# Patient Record
Sex: FEMALE | Race: WHITE | NOT HISPANIC OR LATINO | Employment: OTHER | URBAN - METROPOLITAN AREA
[De-identification: names, ages, dates, MRNs, and addresses within clinical notes are randomized per-mention and may not be internally consistent; named-entity substitution may affect disease eponyms.]

---

## 2024-01-26 ENCOUNTER — OFFICE VISIT (OUTPATIENT)
Age: 89
End: 2024-01-26
Payer: COMMERCIAL

## 2024-01-26 ENCOUNTER — APPOINTMENT (OUTPATIENT)
Dept: RADIOLOGY | Facility: CLINIC | Age: 89
End: 2024-01-26
Payer: COMMERCIAL

## 2024-01-26 VITALS — DIASTOLIC BLOOD PRESSURE: 69 MMHG | SYSTOLIC BLOOD PRESSURE: 103 MMHG | HEART RATE: 72 BPM

## 2024-01-26 DIAGNOSIS — S82.891A CLOSED FRACTURE OF RIGHT ANKLE, INITIAL ENCOUNTER: ICD-10-CM

## 2024-01-26 DIAGNOSIS — S82.891A CLOSED FRACTURE OF RIGHT ANKLE, INITIAL ENCOUNTER: Primary | ICD-10-CM

## 2024-01-26 PROCEDURE — 73600 X-RAY EXAM OF ANKLE: CPT

## 2024-01-26 PROCEDURE — 99203 OFFICE O/P NEW LOW 30 MIN: CPT | Performed by: ORTHOPAEDIC SURGERY

## 2024-01-26 RX ORDER — QUETIAPINE FUMARATE 25 MG/1
TABLET, FILM COATED ORAL
COMMUNITY
Start: 2023-12-11

## 2024-01-26 RX ORDER — NIFEDIPINE 60 MG/1
TABLET, EXTENDED RELEASE ORAL
COMMUNITY
Start: 2023-11-01

## 2024-01-26 RX ORDER — MEMANTINE HYDROCHLORIDE 10 MG/1
TABLET ORAL
COMMUNITY
Start: 2023-12-29

## 2024-01-26 RX ORDER — PRAVASTATIN SODIUM 10 MG
TABLET ORAL
COMMUNITY
Start: 2023-11-08

## 2024-01-26 RX ORDER — ENOXAPARIN SODIUM 100 MG/ML
INJECTION SUBCUTANEOUS
COMMUNITY
Start: 2023-11-26

## 2024-01-26 RX ORDER — DONEPEZIL HYDROCHLORIDE 10 MG/1
TABLET, FILM COATED ORAL
COMMUNITY
Start: 2024-01-03

## 2024-01-26 RX ORDER — LEVOTHYROXINE SODIUM 88 UG/1
TABLET ORAL
COMMUNITY
Start: 2024-01-13

## 2024-01-26 RX ORDER — ACETAMINOPHEN 325 MG/1
650 TABLET ORAL EVERY 6 HOURS PRN
COMMUNITY

## 2024-01-26 RX ORDER — PRAVASTATIN SODIUM 40 MG
TABLET ORAL
COMMUNITY
Start: 2024-01-03

## 2024-01-26 RX ORDER — SENNA AND DOCUSATE SODIUM 50; 8.6 MG/1; MG/1
1 TABLET, FILM COATED ORAL DAILY
COMMUNITY

## 2024-01-26 RX ORDER — APIXABAN 2.5 MG/1
TABLET, FILM COATED ORAL
COMMUNITY
Start: 2024-01-15

## 2024-01-26 RX ORDER — LISINOPRIL 20 MG/1
TABLET ORAL
COMMUNITY
Start: 2024-01-04

## 2024-01-26 RX ORDER — BISACODYL 5 MG/1
10 TABLET, DELAYED RELEASE ORAL DAILY PRN
COMMUNITY

## 2024-01-26 NOTE — PROGRESS NOTES
Assessment/Plan:  1. Closed fracture of right ankle, initial encounter  CANCELED: XR ankle 3+ vw right        89-year-old female presents for evaluation of right ankle.  The patient has a displaced bimalleolar ankle fracture, which is likely about 3 months old at this point. Her ankle is still located and her pain is relatively well controlled. She may progress her weightbearing in her boot at this point. PT may work on ROM and strength. She will FU in 4 weeks with repeat xrays at that time.     Subjective:   Radhika Angela is a 89 y.o. female who presents today for evaluation of her right ankle. She is unsure when she injured this, though we do have documented admission to her care center on 11/7 and she was in a boot at time of admission. She has been partial weightbearing in her boot- which were orders given by someone she was seeing from [facility]. We also have xrays in our system from 12/18/23 which showed a bimalleolar ankle fracture, with displacement of both fractures and subluxation of the ankle joint, but no dislocation of the ankle. I am able to view these images today. The patient can not provide much history due to her dementia, and her care center worker also does not know much of the patient's history. She patient complains of some pain about the ankle, but good sensation of the lower extremity.       Review of Systems   Constitutional: Negative.  Negative for chills and fever.   HENT: Negative.  Negative for ear pain and sore throat.    Eyes: Negative.  Negative for pain and redness.   Respiratory: Negative.  Negative for shortness of breath and wheezing.    Cardiovascular:  Negative for chest pain and palpitations.   Gastrointestinal: Negative.  Negative for abdominal pain and blood in stool.   Endocrine: Negative.  Negative for polydipsia and polyuria.   Genitourinary: Negative.  Negative for difficulty urinating and dysuria.   Musculoskeletal:         As noted in HPI   Skin: Negative.  Negative  for pallor and rash.   Neurological: Negative.  Negative for dizziness and numbness.   Hematological: Negative.  Negative for adenopathy. Does not bruise/bleed easily.   Psychiatric/Behavioral: Negative.  Negative for confusion and suicidal ideas.          Past Medical History:   Diagnosis Date   • Acquired absence of right breast and nipple    • Adjustment disorder with anxiety    • Alzheimer's dementia (HCC)    • Atrophy of vulva    • Constipation    • Dementia (HCC)    • Essential (primary) hypertension    • Hyperlipidemia    • Hypothyroidism    • Major depressive disorder, single episode    • Muscle weakness (generalized)    • Overactive bladder    • Personal history of malignant neoplasm of breast    • Thrombocytopenia (HCC)    • Unspecified mood (affective) disorder (HCC)        History reviewed. No pertinent surgical history.    History reviewed. No pertinent family history.    Social History     Occupational History   • Not on file   Tobacco Use   • Smoking status: Not on file   • Smokeless tobacco: Not on file   Substance and Sexual Activity   • Alcohol use: Not on file   • Drug use: Not on file   • Sexual activity: Not on file         Current Outpatient Medications:   •  acetaminophen (TYLENOL) 325 mg tablet, Take 650 mg by mouth every 6 (six) hours as needed for mild pain or fever, Disp: , Rfl:   •  bisacodyl (DULCOLAX) 5 mg EC tablet, Take 10 mg by mouth daily as needed for constipation, Disp: , Rfl:   •  donepezil (ARICEPT) 10 mg tablet, , Disp: , Rfl:   •  Eliquis 2.5 MG, , Disp: , Rfl:   •  levothyroxine 88 mcg tablet, , Disp: , Rfl:   •  lisinopril (ZESTRIL) 20 mg tablet, , Disp: , Rfl:   •  magnesium hydroxide (MILK OF MAGNESIA) 800 MG/5ML suspension, Take 30 mL by mouth daily as needed for constipation, Disp: , Rfl:   •  memantine (NAMENDA) 10 mg tablet, , Disp: , Rfl:   •  NIFEdipine ER (ADALAT CC) 60 MG 24 hr tablet, , Disp: , Rfl:   •  pravastatin (PRAVACHOL) 40 mg tablet, , Disp: , Rfl:   •   senna-docusate sodium (SENOKOT-S) 8.6-50 mg per tablet, Take 1 tablet by mouth daily, Disp: , Rfl:   •  sertraline (ZOLOFT) 50 mg tablet, , Disp: , Rfl:   •  enoxaparin (LOVENOX) 30 mg/0.3 mL, , Disp: , Rfl:   •  NIFEdipine (PROCARDIA XL) 60 mg 24 hr tablet, , Disp: , Rfl:   •  pravastatin (PRAVACHOL) 10 mg tablet, , Disp: , Rfl:   •  QUEtiapine (SEROquel) 25 mg tablet, , Disp: , Rfl:     Not on File    Objective:  Vitals:    01/26/24 1052   BP: 103/69   Pulse: 72     Pain Score:   2      Ortho Exam  Right ankle. Prominence of medial malleolus but no significant deformity of the ankle. Minimal swelling. No skin breakdown. 5 degrees dorsiflexion, 15 dgrees plantar flexion. Sensation intact. Good capillary refill.     Physical Exam  Constitutional:       General: She is not in acute distress.     Appearance: She is well-developed.   HENT:      Head: Normocephalic and atraumatic.   Eyes:      General: No scleral icterus.     Conjunctiva/sclera: Conjunctivae normal.   Neck:      Vascular: No JVD.   Cardiovascular:      Rate and Rhythm: Normal rate.   Pulmonary:      Effort: Pulmonary effort is normal. No respiratory distress.   Musculoskeletal:      Comments: As per HPI   Skin:     General: Skin is warm.   Neurological:      Mental Status: She is alert and oriented to person, place, and time.      Coordination: Coordination normal.         I have personally reviewed pertinent films in PACS and my interpretation is as follows:  Xrays left ankle: Displaced biamlleolar ankle fracture with no significant further displacement compared to xrays back in December. No ankle dislocation.       This document was created using speech voice recognition software.   Grammatical errors, random word insertions, pronoun errors, and incomplete sentences are an occasional consequence of this system due to software limitations, ambient noise, and hardware issues.   Any formal questions or concerns about content, text, or information  contained within the body of this dictation should be directly addressed to the provider for clarification.

## 2024-02-23 ENCOUNTER — OFFICE VISIT (OUTPATIENT)
Age: 89
End: 2024-02-23
Payer: COMMERCIAL

## 2024-02-23 ENCOUNTER — APPOINTMENT (OUTPATIENT)
Dept: RADIOLOGY | Facility: CLINIC | Age: 89
End: 2024-02-23
Payer: COMMERCIAL

## 2024-02-23 VITALS — SYSTOLIC BLOOD PRESSURE: 110 MMHG | HEART RATE: 74 BPM | DIASTOLIC BLOOD PRESSURE: 66 MMHG

## 2024-02-23 DIAGNOSIS — S82.891A CLOSED FRACTURE OF RIGHT ANKLE, INITIAL ENCOUNTER: Primary | ICD-10-CM

## 2024-02-23 DIAGNOSIS — S82.891A CLOSED FRACTURE OF RIGHT ANKLE, INITIAL ENCOUNTER: ICD-10-CM

## 2024-02-23 PROCEDURE — 73610 X-RAY EXAM OF ANKLE: CPT

## 2024-02-23 PROCEDURE — 99213 OFFICE O/P EST LOW 20 MIN: CPT | Performed by: ORTHOPAEDIC SURGERY

## 2024-02-23 NOTE — PROGRESS NOTES
Assessment/Plan:  1. Closed fracture of right ankle, initial encounter  XR ankle 3+ vw right        Scribe Attestation    I,:  Coni Loarvin am acting as a scribe while in the presence of the attending physician.:       I,:  Ricky Duarte MD personally performed the services described in this documentation    as scribed in my presence.:             Radhika is a pleasant 89-year-old female who presents for follow-up evaluation of the right ankle. I am pleased she is not experiencing pain about the right ankle any more. The boot can be discontinued at this time. She may transition to a normal shoe. She does not need to continue to follow-up as the fracture is stable. She may begin physical therapy, focusing on balance and gait training. She will follow-up as needed in the future.    Subjective:   Radhika Angela is a 89 y.o. female who presents for follow-up evaluation of her right ankle.  She is unsure of when the injury occurred; however, she is at least 14 weeks out from her injury. She denies complaining of any pain.      Review of Systems   Unable to perform ROS: Dementia         Past Medical History:   Diagnosis Date   • Acquired absence of right breast and nipple    • Adjustment disorder with anxiety    • Alzheimer's dementia (HCC)    • Atrophy of vulva    • Constipation    • Dementia (HCC)    • Essential (primary) hypertension    • Hyperlipidemia    • Hypothyroidism    • Major depressive disorder, single episode    • Muscle weakness (generalized)    • Overactive bladder    • Personal history of malignant neoplasm of breast    • Thrombocytopenia (HCC)    • Unspecified mood (affective) disorder (HCC)        No past surgical history on file.    No family history on file.    Social History     Occupational History   • Not on file   Tobacco Use   • Smoking status: Not on file   • Smokeless tobacco: Not on file   Substance and Sexual Activity   • Alcohol use: Not on file   • Drug use: Not on file   • Sexual activity:  Not on file         Current Outpatient Medications:   •  acetaminophen (TYLENOL) 325 mg tablet, Take 650 mg by mouth every 6 (six) hours as needed for mild pain or fever, Disp: , Rfl:   •  bisacodyl (DULCOLAX) 5 mg EC tablet, Take 10 mg by mouth daily as needed for constipation, Disp: , Rfl:   •  donepezil (ARICEPT) 10 mg tablet, , Disp: , Rfl:   •  Eliquis 2.5 MG, , Disp: , Rfl:   •  levothyroxine 88 mcg tablet, , Disp: , Rfl:   •  lisinopril (ZESTRIL) 20 mg tablet, , Disp: , Rfl:   •  magnesium hydroxide (MILK OF MAGNESIA) 800 MG/5ML suspension, Take 30 mL by mouth daily as needed for constipation, Disp: , Rfl:   •  memantine (NAMENDA) 10 mg tablet, , Disp: , Rfl:   •  NIFEdipine ER (ADALAT CC) 60 MG 24 hr tablet, , Disp: , Rfl:   •  pravastatin (PRAVACHOL) 40 mg tablet, , Disp: , Rfl:   •  senna-docusate sodium (SENOKOT-S) 8.6-50 mg per tablet, Take 1 tablet by mouth daily, Disp: , Rfl:   •  sertraline (ZOLOFT) 50 mg tablet, , Disp: , Rfl:   •  enoxaparin (LOVENOX) 30 mg/0.3 mL, , Disp: , Rfl:   •  NIFEdipine (PROCARDIA XL) 60 mg 24 hr tablet, , Disp: , Rfl:   •  pravastatin (PRAVACHOL) 10 mg tablet, , Disp: , Rfl:   •  QUEtiapine (SEROquel) 25 mg tablet, , Disp: , Rfl:     Allergies   Allergen Reactions   • Mirabegron Rash   • Solifenacin Rash   • Tetracycline Rash       Objective:  Vitals:    02/23/24 0935   BP: 110/66   Pulse: 74       Ortho Exam    Right lower extremity: Exam limited by dementia however skin is intact, no significant swelling.  No tenderness to palpation.  Able to flex and extend all toes and at the ankle.  Appears to have sensation intact to light touch in all nerve distribution although limited by dementia.  Warm well-perfused    Physical Exam  Vitals and nursing note reviewed.   Constitutional:       Appearance: Normal appearance.   HENT:      Head: Normocephalic and atraumatic.      Right Ear: External ear normal.      Left Ear: External ear normal.      Nose: Nose normal.   Eyes:       General: No scleral icterus.     Extraocular Movements: Extraocular movements intact.      Conjunctiva/sclera: Conjunctivae normal.   Cardiovascular:      Rate and Rhythm: Normal rate.   Pulmonary:      Effort: Pulmonary effort is normal. No respiratory distress.   Musculoskeletal:      Cervical back: Normal range of motion and neck supple.      Comments: See ortho exam   Skin:     General: Skin is warm and dry.   Neurological:      Mental Status: She is alert and oriented to person, place, and time.   Psychiatric:         Mood and Affect: Mood normal.         Behavior: Behavior normal.         I have personally reviewed pertinent films in PACS and my interpretation is as follows:  X-rays of the right ankle obtained in the office today demonstrate stable appearance of a displaced bimalleolar ankle fracture with some interval signs of healing.      This document was created using speech voice recognition software.   Grammatical errors, random word insertions, pronoun errors, and incomplete sentences are an occasional consequence of this system due to software limitations, ambient noise, and hardware issues.   Any formal questions or concerns about content, text, or information contained within the body of this dictation should be directly addressed to the provider for clarification.

## 2024-05-07 ENCOUNTER — TELEPHONE (OUTPATIENT)
Age: 89
End: 2024-05-07

## 2024-05-07 NOTE — TELEPHONE ENCOUNTER
Don from [facility] nursing called to confirm appointment for tomorrow 5/7/24 at [facility].

## 2024-05-08 ENCOUNTER — OFFICE VISIT (OUTPATIENT)
Dept: OTOLARYNGOLOGY | Facility: CLINIC | Age: 89
End: 2024-05-08
Payer: COMMERCIAL

## 2024-05-08 DIAGNOSIS — H61.23 BILATERAL IMPACTED CERUMEN: ICD-10-CM

## 2024-05-08 DIAGNOSIS — H90.3 SENSORINEURAL HEARING LOSS (SNHL), BILATERAL: Primary | ICD-10-CM

## 2024-05-08 PROCEDURE — 69210 REMOVE IMPACTED EAR WAX UNI: CPT | Performed by: NURSE PRACTITIONER

## 2024-05-08 PROCEDURE — 99203 OFFICE O/P NEW LOW 30 MIN: CPT | Performed by: NURSE PRACTITIONER

## 2024-05-08 RX ORDER — SULFAMETHOXAZOLE AND TRIMETHOPRIM 800; 160 MG/1; MG/1
TABLET ORAL
COMMUNITY
Start: 2024-03-03

## 2024-05-08 NOTE — ASSESSMENT & PLAN NOTE
Here today with caregiver, Resides at [facility]    On exam noted bilateral cerumen impaction and unable to fully view tympanic membrane.  Cerumen impaction removed bilateral eac with alligator forceps and suction, pt tolerated procedure well. Applied HC powder to both ear canals post removal.   Upon removal, improved hearing and decreased clogged sensation of bilateral ears.  Discussed routine cerumen care including avoidance of q-tips, may use cerumen softeners every one to two months.  Hydrocortisone cream pea sized amount on finger as needed for itching in ears.  Encourage ongoing follow up prn to monitor for cerumen and hearing.     Audiogram if symptoms worsen.

## 2024-05-08 NOTE — PROGRESS NOTES
Assessment/Plan:    Bilateral impacted cerumen  Here today with caregiver, Resides at [facility]    On exam noted bilateral cerumen impaction and unable to fully view tympanic membrane.  Cerumen impaction removed bilateral eac with alligator forceps and suction, pt tolerated procedure well. Applied HC powder to both ear canals post removal.   Upon removal, improved hearing and decreased clogged sensation of bilateral ears.  Discussed routine cerumen care including avoidance of q-tips, may use cerumen softeners every one to two months.  Hydrocortisone cream pea sized amount on finger as needed for itching in ears.  Encourage ongoing follow up prn to monitor for cerumen and hearing.     Audiogram if symptoms worsen.       Diagnoses and all orders for this visit:    Sensorineural hearing loss (SNHL), bilateral  -     Ambulatory referral to Audiology    Bilateral impacted cerumen  -     Ear cerumen removal    Other orders  -     sulfamethoxazole-trimethoprim (BACTRIM DS) 800-160 mg per tablet          Subjective:      Patient ID: Radhika Angela is a 89 y.o. female.    Presents today as a new patient due to ear concerns.  Hearing gradually worsening.  Bilateral ears feel blocked.  No tinnitus.  No otalgia or otorrhea.  No history of ear surgery.  No current hearing aids.            The following portions of the patient's history were reviewed and updated as appropriate: allergies, current medications, past family history, past medical history, past social history, past surgical history, and problem list.    Review of Systems   Constitutional: Negative.    HENT:  Positive for hearing loss. Negative for congestion, ear discharge, ear pain, nosebleeds, postnasal drip, rhinorrhea, sinus pressure, sinus pain, sore throat, tinnitus and voice change.    Respiratory:  Negative for chest tightness and shortness of breath.    Skin:  Negative for color change.   Neurological:  Negative for dizziness, numbness and headaches.    Psychiatric/Behavioral: Negative.           Objective:      There were no vitals taken for this visit.         Physical Exam  Constitutional:       Appearance: She is well-developed.   HENT:      Head: Normocephalic.      Right Ear: Hearing, tympanic membrane, ear canal and external ear normal. No decreased hearing noted. No drainage or tenderness. There is impacted cerumen. Tympanic membrane is not perforated or erythematous.      Left Ear: Hearing, tympanic membrane, ear canal and external ear normal. No decreased hearing noted. No drainage or tenderness. There is impacted cerumen. Tympanic membrane is not perforated or erythematous.      Nose: Nose normal. No nasal deformity or septal deviation.      Mouth/Throat:      Mouth: Mucous membranes are not pale and not dry. No oral lesions.      Dentition: Normal dentition.      Pharynx: Uvula midline. No oropharyngeal exudate.   Neck:      Trachea: No tracheal deviation.   Pulmonary:      Effort: Pulmonary effort is normal. No accessory muscle usage or respiratory distress.   Musculoskeletal:      Cervical back: Full passive range of motion without pain and neck supple.   Lymphadenopathy:      Cervical: No cervical adenopathy.   Skin:     General: Skin is warm and dry.   Neurological:      Mental Status: She is alert and oriented to person, place, and time.      Cranial Nerves: No cranial nerve deficit.      Sensory: No sensory deficit.   Psychiatric:         Behavior: Behavior is cooperative.         Ear cerumen removal    Date/Time: 5/8/2024 1:30 PM    Performed by: LITA Roman  Authorized by: LITA Roman  Universal Protocol:  Consent: Verbal consent obtained.  Risks and benefits: risks, benefits and alternatives were discussed  Consent given by: patient  Patient understanding: patient states understanding of the procedure being performed    Patient location:  Clinic  Procedure details:     Local anesthetic:  None    Location:  L ear and R ear     Approach:  External  Post-procedure details:     Complication:  None    Hearing quality:  Normal    Patient tolerance of procedure:  Tolerated well, no immediate complications